# Patient Record
Sex: MALE | Race: WHITE | NOT HISPANIC OR LATINO | Employment: FULL TIME | URBAN - METROPOLITAN AREA
[De-identification: names, ages, dates, MRNs, and addresses within clinical notes are randomized per-mention and may not be internally consistent; named-entity substitution may affect disease eponyms.]

---

## 2023-07-28 ENCOUNTER — ANCILLARY PROCEDURE (OUTPATIENT)
Dept: CARDIOLOGY | Facility: CLINIC | Age: 78
End: 2023-07-28
Attending: INTERNAL MEDICINE
Payer: COMMERCIAL

## 2023-07-28 ENCOUNTER — HOSPITAL ENCOUNTER (OUTPATIENT)
Dept: MRI IMAGING | Facility: CLINIC | Age: 78
Discharge: HOME | End: 2023-07-28
Attending: ORTHOPAEDIC SURGERY
Payer: COMMERCIAL

## 2023-07-28 ENCOUNTER — HOSPITAL ENCOUNTER (OUTPATIENT)
Dept: GENERAL RADIOLOGY | Facility: CLINIC | Age: 78
Discharge: HOME | End: 2023-07-28
Attending: ORTHOPAEDIC SURGERY
Payer: COMMERCIAL

## 2023-07-28 VITALS — OXYGEN SATURATION: 95 % | HEART RATE: 74 BPM

## 2023-07-28 DIAGNOSIS — M54.50 ACUTE LOW BACK PAIN: ICD-10-CM

## 2023-07-28 DIAGNOSIS — Z95.0 CARDIAC PACEMAKER IN SITU: ICD-10-CM

## 2023-07-28 DIAGNOSIS — Z95.0 CARDIAC PACEMAKER IN SITU: Primary | ICD-10-CM

## 2023-07-28 DIAGNOSIS — Z45.02 ENCOUNTER FOR ADJUSTMENT AND MANAGEMENT OF AUTOMATIC IMPLANTABLE CARDIAC DEFIBRILLATOR: ICD-10-CM

## 2023-07-28 LAB
MDC_IDC_LEAD_IMPLANT_DT: NORMAL
MDC_IDC_LEAD_LOCATION: NORMAL
MDC_IDC_LEAD_LOCATION_DETAIL_1: NORMAL
MDC_IDC_LEAD_MFG: NORMAL
MDC_IDC_LEAD_MODEL: NORMAL
MDC_IDC_LEAD_POLARITY_TYPE: NORMAL
MDC_IDC_LEAD_SERIAL: NORMAL
MDC_IDC_LEAD_SPECIAL_FUNCTION: NORMAL
MDC_IDC_MSMT_BATTERY_DTM: NORMAL
MDC_IDC_MSMT_BATTERY_DTM: NORMAL
MDC_IDC_MSMT_BATTERY_REMAINING_LONGEVITY: 175 MO
MDC_IDC_MSMT_BATTERY_REMAINING_LONGEVITY: 175 MO
MDC_IDC_MSMT_BATTERY_RRT_TRIGGER: 2.62
MDC_IDC_MSMT_BATTERY_RRT_TRIGGER: 2.62
MDC_IDC_MSMT_BATTERY_STATUS: NORMAL
MDC_IDC_MSMT_BATTERY_STATUS: NORMAL
MDC_IDC_MSMT_BATTERY_VOLTAGE: 3.21 V
MDC_IDC_MSMT_BATTERY_VOLTAGE: 3.21 V
MDC_IDC_MSMT_LEADCHNL_RA_IMPEDANCE_VALUE: 418 OHM
MDC_IDC_MSMT_LEADCHNL_RA_IMPEDANCE_VALUE: 418 OHM
MDC_IDC_MSMT_LEADCHNL_RA_PACING_THRESHOLD_AMPLITUDE: 0.5 V
MDC_IDC_MSMT_LEADCHNL_RA_PACING_THRESHOLD_AMPLITUDE: 0.5 V
MDC_IDC_MSMT_LEADCHNL_RA_PACING_THRESHOLD_PULSEWIDTH: 0.4 MS
MDC_IDC_MSMT_LEADCHNL_RA_PACING_THRESHOLD_PULSEWIDTH: 0.4 MS
MDC_IDC_MSMT_LEADCHNL_RA_SENSING_INTR_AMPL: 1.5 MV
MDC_IDC_MSMT_LEADCHNL_RA_SENSING_INTR_AMPL: 1.6 MV
MDC_IDC_MSMT_LEADCHNL_RV_IMPEDANCE_VALUE: 456 OHM
MDC_IDC_MSMT_LEADCHNL_RV_IMPEDANCE_VALUE: 475 OHM
MDC_IDC_MSMT_LEADCHNL_RV_PACING_THRESHOLD_AMPLITUDE: 1.75 V
MDC_IDC_MSMT_LEADCHNL_RV_PACING_THRESHOLD_AMPLITUDE: 1.75 V
MDC_IDC_MSMT_LEADCHNL_RV_PACING_THRESHOLD_PULSEWIDTH: 0.4 MS
MDC_IDC_MSMT_LEADCHNL_RV_PACING_THRESHOLD_PULSEWIDTH: 0.4 MS
MDC_IDC_MSMT_LEADCHNL_RV_SENSING_INTR_AMPL: 4.1 MV
MDC_IDC_MSMT_LEADCHNL_RV_SENSING_INTR_AMPL: 4.6 MV
MDC_IDC_PG_IMPLANT_DTM: NORMAL
MDC_IDC_PG_IMPLANT_DTM: NORMAL
MDC_IDC_PG_MFG: NORMAL
MDC_IDC_PG_MFG: NORMAL
MDC_IDC_PG_MODEL: NORMAL
MDC_IDC_PG_MODEL: NORMAL
MDC_IDC_PG_SERIAL: NORMAL
MDC_IDC_PG_SERIAL: NORMAL
MDC_IDC_PG_TYPE: NORMAL
MDC_IDC_PG_TYPE: NORMAL
MDC_IDC_SESS_CLINIC_NAME: NORMAL
MDC_IDC_SESS_CLINIC_NAME: NORMAL
MDC_IDC_SESS_DTM: NORMAL
MDC_IDC_SESS_DTM: NORMAL
MDC_IDC_SESS_TYPE: NORMAL
MDC_IDC_SESS_TYPE: NORMAL
MDC_IDC_SET_BRADY_AT_MODE_SWITCH_RATE: 171 {BEATS}/MIN
MDC_IDC_SET_BRADY_AT_MODE_SWITCH_RATE: 171 {BEATS}/MIN
MDC_IDC_SET_BRADY_HYSTRATE: NORMAL
MDC_IDC_SET_BRADY_HYSTRATE: NORMAL
MDC_IDC_SET_BRADY_LOWRATE: 60 {BEATS}/MIN
MDC_IDC_SET_BRADY_LOWRATE: 60 {BEATS}/MIN
MDC_IDC_SET_BRADY_MAX_SENSOR_RATE: 130 {BEATS}/MIN
MDC_IDC_SET_BRADY_MAX_SENSOR_RATE: 130 {BEATS}/MIN
MDC_IDC_SET_BRADY_MAX_TRACKING_RATE: 130 {BEATS}/MIN
MDC_IDC_SET_BRADY_MAX_TRACKING_RATE: 130 {BEATS}/MIN
MDC_IDC_SET_BRADY_MODE: NORMAL
MDC_IDC_SET_BRADY_MODE: NORMAL
MDC_IDC_SET_BRADY_PAV_DELAY_LOW: 180 MS
MDC_IDC_SET_BRADY_PAV_DELAY_LOW: 180 MS
MDC_IDC_SET_BRADY_SAV_DELAY_LOW: 150 MS
MDC_IDC_SET_BRADY_SAV_DELAY_LOW: 150 MS
MDC_IDC_SET_LEADCHNL_RA_PACING_AMPLITUDE: 1.5 V
MDC_IDC_SET_LEADCHNL_RA_PACING_AMPLITUDE: 1.5 V
MDC_IDC_SET_LEADCHNL_RA_PACING_ANODE_ELECTRODE_1: NORMAL
MDC_IDC_SET_LEADCHNL_RA_PACING_ANODE_ELECTRODE_1: NORMAL
MDC_IDC_SET_LEADCHNL_RA_PACING_ANODE_LOCATION_1: NORMAL
MDC_IDC_SET_LEADCHNL_RA_PACING_ANODE_LOCATION_1: NORMAL
MDC_IDC_SET_LEADCHNL_RA_PACING_CAPTURE_MODE: NORMAL
MDC_IDC_SET_LEADCHNL_RA_PACING_CAPTURE_MODE: NORMAL
MDC_IDC_SET_LEADCHNL_RA_PACING_CATHODE_ELECTRODE_1: NORMAL
MDC_IDC_SET_LEADCHNL_RA_PACING_CATHODE_ELECTRODE_1: NORMAL
MDC_IDC_SET_LEADCHNL_RA_PACING_CATHODE_LOCATION_1: NORMAL
MDC_IDC_SET_LEADCHNL_RA_PACING_CATHODE_LOCATION_1: NORMAL
MDC_IDC_SET_LEADCHNL_RA_PACING_POLARITY: NORMAL
MDC_IDC_SET_LEADCHNL_RA_PACING_POLARITY: NORMAL
MDC_IDC_SET_LEADCHNL_RA_PACING_PULSEWIDTH: 0.4 MS
MDC_IDC_SET_LEADCHNL_RA_PACING_PULSEWIDTH: 0.4 MS
MDC_IDC_SET_LEADCHNL_RA_SENSING_ANODE_ELECTRODE_1: NORMAL
MDC_IDC_SET_LEADCHNL_RA_SENSING_ANODE_ELECTRODE_1: NORMAL
MDC_IDC_SET_LEADCHNL_RA_SENSING_ANODE_LOCATION_1: NORMAL
MDC_IDC_SET_LEADCHNL_RA_SENSING_ANODE_LOCATION_1: NORMAL
MDC_IDC_SET_LEADCHNL_RA_SENSING_CATHODE_ELECTRODE_1: NORMAL
MDC_IDC_SET_LEADCHNL_RA_SENSING_CATHODE_ELECTRODE_1: NORMAL
MDC_IDC_SET_LEADCHNL_RA_SENSING_CATHODE_LOCATION_1: NORMAL
MDC_IDC_SET_LEADCHNL_RA_SENSING_CATHODE_LOCATION_1: NORMAL
MDC_IDC_SET_LEADCHNL_RA_SENSING_POLARITY: NORMAL
MDC_IDC_SET_LEADCHNL_RA_SENSING_POLARITY: NORMAL
MDC_IDC_SET_LEADCHNL_RA_SENSING_SENSITIVITY: 0.15 MV
MDC_IDC_SET_LEADCHNL_RA_SENSING_SENSITIVITY: 0.15 MV
MDC_IDC_SET_LEADCHNL_RV_PACING_AMPLITUDE: 2.75 V
MDC_IDC_SET_LEADCHNL_RV_PACING_AMPLITUDE: 2.75 V
MDC_IDC_SET_LEADCHNL_RV_PACING_ANODE_ELECTRODE_1: NORMAL
MDC_IDC_SET_LEADCHNL_RV_PACING_ANODE_ELECTRODE_1: NORMAL
MDC_IDC_SET_LEADCHNL_RV_PACING_ANODE_LOCATION_1: NORMAL
MDC_IDC_SET_LEADCHNL_RV_PACING_ANODE_LOCATION_1: NORMAL
MDC_IDC_SET_LEADCHNL_RV_PACING_CAPTURE_MODE: NORMAL
MDC_IDC_SET_LEADCHNL_RV_PACING_CAPTURE_MODE: NORMAL
MDC_IDC_SET_LEADCHNL_RV_PACING_CATHODE_ELECTRODE_1: NORMAL
MDC_IDC_SET_LEADCHNL_RV_PACING_CATHODE_ELECTRODE_1: NORMAL
MDC_IDC_SET_LEADCHNL_RV_PACING_CATHODE_LOCATION_1: NORMAL
MDC_IDC_SET_LEADCHNL_RV_PACING_CATHODE_LOCATION_1: NORMAL
MDC_IDC_SET_LEADCHNL_RV_PACING_POLARITY: NORMAL
MDC_IDC_SET_LEADCHNL_RV_PACING_POLARITY: NORMAL
MDC_IDC_SET_LEADCHNL_RV_PACING_PULSEWIDTH: 0.4 MS
MDC_IDC_SET_LEADCHNL_RV_PACING_PULSEWIDTH: 0.4 MS
MDC_IDC_SET_LEADCHNL_RV_SENSING_ANODE_ELECTRODE_1: NORMAL
MDC_IDC_SET_LEADCHNL_RV_SENSING_ANODE_ELECTRODE_1: NORMAL
MDC_IDC_SET_LEADCHNL_RV_SENSING_ANODE_LOCATION_1: NORMAL
MDC_IDC_SET_LEADCHNL_RV_SENSING_ANODE_LOCATION_1: NORMAL
MDC_IDC_SET_LEADCHNL_RV_SENSING_CATHODE_ELECTRODE_1: NORMAL
MDC_IDC_SET_LEADCHNL_RV_SENSING_CATHODE_ELECTRODE_1: NORMAL
MDC_IDC_SET_LEADCHNL_RV_SENSING_CATHODE_LOCATION_1: NORMAL
MDC_IDC_SET_LEADCHNL_RV_SENSING_CATHODE_LOCATION_1: NORMAL
MDC_IDC_SET_LEADCHNL_RV_SENSING_POLARITY: NORMAL
MDC_IDC_SET_LEADCHNL_RV_SENSING_POLARITY: NORMAL
MDC_IDC_SET_LEADCHNL_RV_SENSING_SENSITIVITY: 0.9 MV
MDC_IDC_SET_LEADCHNL_RV_SENSING_SENSITIVITY: 0.9 MV
MDC_IDC_SET_ZONE_DETECTION_INTERVAL: 350 MS
MDC_IDC_SET_ZONE_DETECTION_INTERVAL: 350 MS
MDC_IDC_SET_ZONE_DETECTION_INTERVAL: 400 MS
MDC_IDC_SET_ZONE_DETECTION_INTERVAL: 400 MS
MDC_IDC_SET_ZONE_TYPE: NORMAL
MDC_IDC_STAT_AT_BURDEN_PERCENT: 0 %
MDC_IDC_STAT_AT_BURDEN_PERCENT: 0 %
MDC_IDC_STAT_AT_DTM_END: NORMAL
MDC_IDC_STAT_AT_DTM_END: NORMAL
MDC_IDC_STAT_AT_DTM_START: NORMAL
MDC_IDC_STAT_AT_DTM_START: NORMAL
MDC_IDC_STAT_BRADY_AP_VP_PERCENT: 0.05 %
MDC_IDC_STAT_BRADY_AP_VP_PERCENT: 0.05 %
MDC_IDC_STAT_BRADY_AP_VS_PERCENT: 26.71 %
MDC_IDC_STAT_BRADY_AP_VS_PERCENT: 26.72 %
MDC_IDC_STAT_BRADY_AS_VP_PERCENT: 0.03 %
MDC_IDC_STAT_BRADY_AS_VP_PERCENT: 0.03 %
MDC_IDC_STAT_BRADY_AS_VS_PERCENT: 73.21 %
MDC_IDC_STAT_BRADY_AS_VS_PERCENT: 73.22 %
MDC_IDC_STAT_BRADY_DTM_END: NORMAL
MDC_IDC_STAT_BRADY_DTM_END: NORMAL
MDC_IDC_STAT_BRADY_DTM_START: NORMAL
MDC_IDC_STAT_BRADY_DTM_START: NORMAL
MDC_IDC_STAT_BRADY_RA_PERCENT_PACED: 26.82 %
MDC_IDC_STAT_BRADY_RA_PERCENT_PACED: 26.83 %
MDC_IDC_STAT_BRADY_RV_PERCENT_PACED: 0.08 %
MDC_IDC_STAT_BRADY_RV_PERCENT_PACED: 0.08 %
MDC_IDC_STAT_EPISODE_RECENT_COUNT: 0
MDC_IDC_STAT_EPISODE_RECENT_COUNT_DTM_END: NORMAL
MDC_IDC_STAT_EPISODE_RECENT_COUNT_DTM_START: NORMAL
MDC_IDC_STAT_EPISODE_TOTAL_COUNT: 0
MDC_IDC_STAT_EPISODE_TOTAL_COUNT: 1
MDC_IDC_STAT_EPISODE_TOTAL_COUNT: 1
MDC_IDC_STAT_EPISODE_TOTAL_COUNT_DTM_END: NORMAL
MDC_IDC_STAT_EPISODE_TOTAL_COUNT_DTM_START: NORMAL
MDC_IDC_STAT_EPISODE_TYPE: NORMAL

## 2023-07-28 PROCEDURE — 93286 PERI-PX EVAL PM/LDLS PM IP: CPT

## 2023-07-28 PROCEDURE — 72148 MRI LUMBAR SPINE W/O DYE: CPT

## 2023-07-28 PROCEDURE — 71046 X-RAY EXAM CHEST 2 VIEWS: CPT

## 2023-07-28 PROCEDURE — 71046 X-RAY EXAM CHEST 2 VIEWS: CPT | Mod: 26 | Performed by: RADIOLOGY

## 2023-07-28 PROCEDURE — 72148 MRI LUMBAR SPINE W/O DYE: CPT | Mod: 26 | Performed by: RADIOLOGY

## 2023-07-28 PROCEDURE — 93286 PERI-PX EVAL PM/LDLS PM IP: CPT | Mod: 26 | Performed by: INTERNAL MEDICINE

## 2023-11-26 ENCOUNTER — LAB REQUISITION (OUTPATIENT)
Dept: LAB | Facility: CLINIC | Age: 78
End: 2023-11-26
Payer: COMMERCIAL

## 2023-11-26 DIAGNOSIS — Z11.1 ENCOUNTER FOR SCREENING FOR RESPIRATORY TUBERCULOSIS: ICD-10-CM

## 2023-11-27 ENCOUNTER — TRANSITIONAL CARE UNIT VISIT (OUTPATIENT)
Dept: GERIATRICS | Facility: CLINIC | Age: 78
End: 2023-11-27
Payer: COMMERCIAL

## 2023-11-27 VITALS
WEIGHT: 205 LBS | OXYGEN SATURATION: 96 % | TEMPERATURE: 97.8 F | SYSTOLIC BLOOD PRESSURE: 119 MMHG | HEIGHT: 72 IN | BODY MASS INDEX: 27.77 KG/M2 | HEART RATE: 69 BPM | RESPIRATION RATE: 18 BRPM | DIASTOLIC BLOOD PRESSURE: 60 MMHG

## 2023-11-27 DIAGNOSIS — G89.29 CHRONIC MIDLINE LOW BACK PAIN WITH BILATERAL SCIATICA: Primary | ICD-10-CM

## 2023-11-27 DIAGNOSIS — M54.42 CHRONIC MIDLINE LOW BACK PAIN WITH BILATERAL SCIATICA: Primary | ICD-10-CM

## 2023-11-27 DIAGNOSIS — K21.9 CHRONIC GERD: ICD-10-CM

## 2023-11-27 DIAGNOSIS — M54.41 CHRONIC MIDLINE LOW BACK PAIN WITH BILATERAL SCIATICA: Primary | ICD-10-CM

## 2023-11-27 DIAGNOSIS — M62.81 GENERALIZED MUSCLE WEAKNESS: ICD-10-CM

## 2023-11-27 DIAGNOSIS — R73.9 HYPERGLYCEMIA: ICD-10-CM

## 2023-11-27 PROBLEM — S09.90XA INJURY OF HEAD: Chronic | Status: ACTIVE | Noted: 2019-08-05

## 2023-11-27 PROBLEM — I25.10 ATHEROSCLEROTIC HEART DISEASE OF NATIVE CORONARY ARTERY WITHOUT ANGINA PECTORIS: Status: ACTIVE | Noted: 2021-02-03

## 2023-11-27 PROBLEM — R91.8 OTHER NONSPECIFIC ABNORMAL FINDING OF LUNG FIELD: Status: ACTIVE | Noted: 2021-02-03

## 2023-11-27 PROBLEM — H53.2 DOUBLE VISION: Status: ACTIVE | Noted: 2021-02-01

## 2023-11-27 PROBLEM — G83.4 CAUDA EQUINA SYNDROME (H): Status: ACTIVE | Noted: 2023-11-17

## 2023-11-27 PROBLEM — I20.0 UNSTABLE ANGINA (H): Status: ACTIVE | Noted: 2019-10-09

## 2023-11-27 PROBLEM — R93.89 ABNORMAL CT OF THE CHEST: Status: ACTIVE | Noted: 2023-02-15

## 2023-11-27 PROBLEM — R11.2 NAUSEA AND VOMITING: Status: ACTIVE | Noted: 2021-02-01

## 2023-11-27 PROBLEM — S06.9XAA TRAUMATIC BRAIN INJURY (H): Status: ACTIVE | Noted: 2021-02-12

## 2023-11-27 PROBLEM — E78.00 HYPERCHOLESTEREMIA: Status: ACTIVE | Noted: 2017-02-10

## 2023-11-27 PROBLEM — R26.2 DIFFICULTY IN WALKING, NOT ELSEWHERE CLASSIFIED: Status: ACTIVE | Noted: 2021-02-03
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
## 2023-11-27 PROBLEM — S06.0X0D CONCUSSION WITHOUT LOSS OF CONSCIOUSNESS, SUBSEQUENT ENCOUNTER: Status: ACTIVE | Noted: 2021-02-03

## 2023-11-27 PROBLEM — C67.9 MALIGNANT NEOPLASM OF BLADDER, UNSPECIFIED (H): Status: ACTIVE | Noted: 2021-02-03

## 2023-11-27 PROBLEM — R33.8 ACUTE URINARY RETENTION: Status: ACTIVE | Noted: 2023-11-19

## 2023-11-27 PROBLEM — R51.9 HEADACHE: Status: ACTIVE | Noted: 2021-02-01

## 2023-11-27 PROBLEM — M54.6 ACUTE MIDLINE THORACIC BACK PAIN: Status: ACTIVE | Noted: 2023-02-15

## 2023-11-27 PROBLEM — I49.5 SICK SINUS SYNDROME (H): Status: ACTIVE | Noted: 2018-01-24

## 2023-11-27 PROBLEM — M43.22 CERVICAL VERTEBRAL FUSION: Status: ACTIVE | Noted: 2017-07-31

## 2023-11-27 PROBLEM — N17.9 AKI (ACUTE KIDNEY INJURY) (H): Status: ACTIVE | Noted: 2019-10-09

## 2023-11-27 PROBLEM — R91.8 LUNG NODULES: Status: ACTIVE | Noted: 2019-10-09

## 2023-11-27 PROBLEM — S41.112D: Status: ACTIVE | Noted: 2018-06-21

## 2023-11-27 PROBLEM — I95.9 HYPOTENSION: Status: ACTIVE | Noted: 2019-11-04

## 2023-11-27 PROBLEM — R41.3 MEMORY LOSS: Status: ACTIVE | Noted: 2021-02-01

## 2023-11-27 PROBLEM — R10.32 LLQ ABDOMINAL PAIN: Status: ACTIVE | Noted: 2019-12-26

## 2023-11-27 PROBLEM — F32.A ANXIETY AND DEPRESSION: Status: ACTIVE | Noted: 2018-01-24

## 2023-11-27 PROBLEM — E53.8 B12 DEFICIENCY: Status: ACTIVE | Noted: 2017-07-31

## 2023-11-27 PROBLEM — F33.9 EPISODE OF RECURRENT MAJOR DEPRESSIVE DISORDER (H): Status: ACTIVE | Noted: 2020-02-18

## 2023-11-27 PROBLEM — K29.80 DUODENITIS: Status: ACTIVE | Noted: 2022-02-18

## 2023-11-27 PROBLEM — M48.061 SPINAL STENOSIS OF LUMBAR REGION WITHOUT NEUROGENIC CLAUDICATION: Status: ACTIVE | Noted: 2023-11-20

## 2023-11-27 PROBLEM — I45.10 RBBB: Status: ACTIVE | Noted: 2019-10-09

## 2023-11-27 PROBLEM — K63.5 POLYP OF COLON: Status: ACTIVE | Noted: 2018-08-16

## 2023-11-27 PROBLEM — R42 DIZZINESS: Status: ACTIVE | Noted: 2018-07-15

## 2023-11-27 PROBLEM — M25.551 ACUTE RIGHT HIP PAIN: Status: ACTIVE | Noted: 2019-07-01

## 2023-11-27 PROBLEM — D69.6 THROMBOCYTOPENIA (H): Status: ACTIVE | Noted: 2019-02-05

## 2023-11-27 PROBLEM — R05.9 COUGH: Status: ACTIVE | Noted: 2019-08-05

## 2023-11-27 PROBLEM — I71.40 AAA (ABDOMINAL AORTIC ANEURYSM) (H): Status: ACTIVE | Noted: 2018-01-24

## 2023-11-27 PROBLEM — R55 SYNCOPE: Status: ACTIVE | Noted: 2019-08-05

## 2023-11-27 PROBLEM — K43.0 INCARCERATED INCISIONAL HERNIA: Status: ACTIVE | Noted: 2019-01-16

## 2023-11-27 PROBLEM — F41.9 ANXIETY AND DEPRESSION: Status: ACTIVE | Noted: 2018-01-24

## 2023-11-27 PROBLEM — Z93.3 S/P COLOSTOMY (H): Status: ACTIVE | Noted: 2017-07-31

## 2023-11-27 PROBLEM — G47.33 OBSTRUCTIVE SLEEP APNEA OF ADULT: Status: ACTIVE | Noted: 2022-03-02

## 2023-11-27 PROCEDURE — 99310 SBSQ NF CARE HIGH MDM 45: CPT | Performed by: NURSE PRACTITIONER

## 2023-11-27 RX ORDER — GABAPENTIN 600 MG/1
600 TABLET ORAL 3 TIMES DAILY
COMMUNITY

## 2023-11-27 RX ORDER — ACETAMINOPHEN 500 MG
1000 TABLET ORAL 3 TIMES DAILY
COMMUNITY

## 2023-11-27 RX ORDER — ATORVASTATIN CALCIUM 80 MG/1
80 TABLET, FILM COATED ORAL DAILY
COMMUNITY

## 2023-11-27 RX ORDER — MOMETASONE FUROATE MONOHYDRATE 50 UG/1
2 SPRAY, METERED NASAL DAILY
COMMUNITY

## 2023-11-27 RX ORDER — NITROGLYCERIN 0.3 MG/1
0.3 TABLET SUBLINGUAL EVERY 5 MIN PRN
COMMUNITY

## 2023-11-27 RX ORDER — TAMSULOSIN HYDROCHLORIDE 0.4 MG/1
0.4 CAPSULE ORAL DAILY
COMMUNITY

## 2023-11-27 RX ORDER — LIDOCAINE 50 MG/G
PATCH TOPICAL EVERY 24 HOURS
COMMUNITY

## 2023-11-27 RX ORDER — LOSARTAN POTASSIUM 25 MG/1
25 TABLET ORAL DAILY
COMMUNITY

## 2023-11-27 RX ORDER — MIRTAZAPINE 45 MG/1
45 TABLET, FILM COATED ORAL AT BEDTIME
COMMUNITY

## 2023-11-27 RX ORDER — HYDROMORPHONE HYDROCHLORIDE 2 MG/1
2-4 TABLET ORAL EVERY 4 HOURS PRN
COMMUNITY

## 2023-11-27 RX ORDER — ALBUTEROL SULFATE 90 UG/1
2 AEROSOL, METERED RESPIRATORY (INHALATION) EVERY 4 HOURS PRN
COMMUNITY

## 2023-11-27 RX ORDER — INSULIN LISPRO 100 [IU]/ML
INJECTION, SOLUTION INTRAVENOUS; SUBCUTANEOUS
COMMUNITY
End: 2023-11-27

## 2023-11-27 RX ORDER — ASPIRIN 81 MG/1
81 TABLET ORAL DAILY
COMMUNITY

## 2023-11-27 NOTE — LETTER
11/27/2023        RE: Malvin Phillips  [address] [address]                                                                                      [facility]     Code Status:  FULL CODE  Visit Type: Hospital F/U     Facility:   [facility] ([facility]) [[id number]]        History of Present Illness:   Hospital Admission Date: 11/17/2023    Hospital Discharge Date: 11/24/2023      Malvin Phillips is a 78 year old male with past medical history for AAA, COPD, DM2, chronic low back pain.  He was recently hospitalized for worsening back pain and urinary incontinence.  He was seen by neurosurgy and cauda equina syndrome was ruled out.  He underwent left L5-S1 CHAITANYA.  Gabapentin was increased and he was put on apap, dilaudid and lidocaine patch.      Today, he is demanding to be discharged and feels his pain has greatly improved.  He plans to follow up with orthopedics and will be setting up outpatient therapy.      BS have been < 200 here in the facility.  Patient does not take insulin at home and was started by the hospital due to increased BS after steroid injection.     History reviewed. No pertinent past medical history.  History reviewed. No pertinent surgical history.  History reviewed. No pertinent family history.  Social History     Socioeconomic History     Marital status:      Spouse name: Not on file     Number of children: Not on file     Years of education: Not on file     Highest education level: Not on file   Occupational History     Not on file   Tobacco Use     Smoking status: Unknown     Smokeless tobacco: Not on file   Substance and Sexual Activity     Alcohol use: Not on file     Drug use: Not on file     Sexual activity: Not on file   Other Topics Concern     Not on file   Social History Narrative     Not on file     Social Determinants of Health     Financial Resource Strain: Not on file   Food Insecurity: Not on file   Transportation Needs: Not  on file   Physical Activity: Not on file   Stress: Not on file   Social Connections: Not on file   Interpersonal Safety: Not on file   Housing Stability: Not on file       Current Outpatient Medications   Medication Sig Dispense Refill     acetaminophen (TYLENOL) 500 MG tablet Take 1,000 mg by mouth 3 times daily       albuterol (PROAIR HFA/PROVENTIL HFA/VENTOLIN HFA) 108 (90 Base) MCG/ACT inhaler Inhale 2 puffs into the lungs every 4 hours as needed for shortness of breath, wheezing or cough       aspirin 81 MG EC tablet Take 81 mg by mouth daily       atorvastatin (LIPITOR) 80 MG tablet Take 80 mg by mouth daily       gabapentin (NEURONTIN) 600 MG tablet Take 600 mg by mouth 3 times daily       HYDROmorphone (DILAUDID) 2 MG tablet Take 2-4 mg by mouth every 4 hours as needed for severe pain       lidocaine (LIDODERM) 5 % patch Place onto the skin every 24 hours To prevent lidocaine toxicity, patient should be patch free for 12 hrs daily.       losartan (COZAAR) 25 MG tablet Take 25 mg by mouth daily       mirtazapine (REMERON) 45 MG tablet Take 45 mg by mouth at bedtime       mometasone (NASONEX) 50 MCG/ACT nasal spray Spray 2 sprays into both nostrils daily       nitroGLYcerin (NITROSTAT) 0.3 MG sublingual tablet Place 0.3 mg under the tongue every 5 minutes as needed for chest pain For chest pain place 1 tablet under the tongue every 5 minutes for 3 doses. If symptoms persist 5 minutes after 1st dose call 911.       omeprazole (PRILOSEC) 20 MG DR capsule Take 20 mg by mouth daily       tamsulosin (FLOMAX) 0.4 MG capsule Take 0.4 mg by mouth daily       umeclidinium-vilanterol (ANORO ELLIPTA) 62.5-25 MCG/ACT oral inhaler Inhale 1 puff into the lungs daily       Allergies   Allergen Reactions     Fentanyl Dermatitis, Itching and Swelling     Fentanyl patch: reaction: swelling/itching     Penicillins Unknown, Dermatitis, Other (See Comments) and Shortness Of Breath     Other reaction(s): *Unknown      As a child       As a child Per the Antimicrobial Stewardship Team 10/19:  No similarities in side chains for PCN and Ancef, very low to no risk of cross-reactivity.      Other reaction(s): *Unknown Other reaction(s): *Unknown As a child As a child    As a child   Per the Antimicrobial Stewardship Team 10/19:  No similarities in side chains for PCN and Ancef, very low to no risk of cross-reactivity.     Immunization History   Administered Date(s) Administered     COVID-19 12+ (2023-24) (Pfizer) 10/28/2023     COVID-19 Bivalent 12+ (Pfizer) 09/22/2022     COVID-19 MONOVALENT 12+ (Pfizer) 02/19/2021, 03/12/2021, 09/29/2021     COVID-19 Monovalent 12+ (Pfizer 2022) 04/09/2022     DT (PEDS <7y) 01/02/1984     DTaP, Unspecified 05/30/2015     Flu 65+ Years 09/25/2017, 10/07/2018, 09/12/2019     Flu, Unspecified 11/04/1992, 11/22/1993, 11/09/1994, 10/01/2012, 11/25/2013, 11/17/2014, 11/02/2015     D0k9-04 Novel Flu 09/25/2017, 10/07/2018, 09/12/2019     Influenza (High Dose) 3 valent vaccine 10/08/2017, 10/07/2018     Influenza Vaccine 65+ (FLUAD) 10/25/2022     Influenza Vaccine 65+ (Fluzone HD) 09/29/2020, 09/25/2021, 10/28/2023     Pneumo Conj 13-V (2010&after) 04/16/2016     Pneumococcal 23 valent 01/01/2010, 01/16/2016     RSV Vaccine (Abrysvo) 11/04/2023     TDAP (Adacel,Boostrix) 01/01/2007, 05/30/2015     Zoster recombinant adjuvanted (SHINGRIX) 03/16/2019, 01/03/2021, 07/16/2021     Zoster vaccine, live 01/17/2013, 07/25/2013, 01/03/2021         Post Discharge Medication Reconciliation Status: discharge medications reconciled and changed, per note/orders.    Medications list and allergies in the facility chart have been reviewed.  Please see facility EMR for most up to date list.         Review of Systems   Patient denies fever, chills, headache, lightheadedness, dizziness, rhinorrhea, cough, congestion, shortness of breath, chest pain, palpitations, abdominal pain, n/v, diarrhea, constipation, change in appetite, change in  sleep pattern, dysuria, frequency, burning or pain with urination.  Other than stated in HPI all other review of systems is negative.       Physical Exam  Vital signs:/60   Pulse 69   Temp 97.8  F (36.6  C)   Resp 18   Ht 1.829 m (6')   Wt 93 kg (205 lb)   SpO2 96%   BMI 27.80 kg/m     GENERAL APPEARANCE: thin, elderly male,  in no acute distress.  HEENT: normocephalic, atraumatic  sclerae anicteric, conjunctivae clear and moist, EOM intact  LUNGS: Lung sounds CTA, no adventitious sounds, respiratory effort normal.  CARD: RRR, S1, S2, without murmurs, gallops, rubs  MSK: Muscle strength and tone were equal bilaterally. Moves all extremities easily and intentionally.   EXTREMITIES: No cyanosis, clubbing or edema.  NEURO: Alert and oriented x 3. Face is symmetric.  SKIN: Inspection of the skin reveals no rashes, ulcerations or petechiae.  PSYCH: euthymic        Labs:    Hemoglobin A1C 6.8 (H) <=5.6 %     WBC 9.1 3.5 - 10.5 x10(9)/L   11/19/2023 1:17 PM [facility]     RBC 4.36 4.32 - 5.72 x10(12)/L   11/19/2023 1:17 PM [facility]     Hemoglobin 12.9 (L) 13.5 - 17.5 g/dL   11/19/2023 1:17 PM [facility]     HCT 39.0 38.8 - 50.0 %   11/19/2023 1:17 PM [facility]     MCV 89.4 80.0 - 100.0 fL   11/19/2023 1:17 PM [facility]     MCH 29.6 27.6 - 33.3 pg   11/19/2023 1:17 PM [facility]     MCHC 33.1 31.5 - 35.2 g/dL   11/19/2023 1:17 PM [facility]     RDW 14.3 11.9 - 15.5 %   11/19/2023 1:17 PM [facility]     Platelets 120 (L) 150 - 450 x10(9)/L   11/19/2023 1:17 PM [facility]     Automated NRBC 0 <=0 /100 WBC   11/19/2023 1:17 PM [facility]     Neutrophil Absolute 6.9 1.7 - 7.0 10(9)/L   11/19/2023 1:17 PM [facility]     Lymphocyte Absolute 1.3 1.0 - 4.8 10(9)/L   11/19/2023 1:17 PM [facility]     Monocyte Absolute 0.8 0.2 - 0.9 10(9)/L    11/19/2023 1:17 PM [facility]     Eosinophil Absolute 0.1 0.0 - 0.5 10(9)/L   11/19/2023 1:17 PM [facility]     Basophil Absolute 0.0 0.0 - 0.3 10(9)/L   11/19/2023 1:17 PM [facility]     Immature Granulocyte % 0.3 0.0 - 0.5 %          Sodium 137 136 - 145 mmol/L   11/20/2023 10:16 AM [facility]     Potassium 4.1 3.5 - 5.1 mmol/L   11/20/2023 10:16 AM [facility]     Comment: Specimen slightly hemolyzed. Hemolysis may affect result.   Chloride 102 98 - 109 mmol/L   11/20/2023 10:16 AM [facility]     CO2 27 20 - 29 mmol/L   [facility]2023 10[facility]     Anion Gap 8 7 - 16 mmol/L   11/20/2023 10:16 AM [facility]     Calcium 9.4 8.4 - 10.4 mg/dL   11/20/2023 10:16 AM [facility]     BUN 17 7 - 26 mg/dL   11/20/2023 10:16 [facility]     Creatinine 1.03 0.73 - 1.18 mg/dL   11/20/2023 10:16 AM [facility]     Glucose 193 (H) 70 - 100 mg/dL                Assessment/plan:   Chronic midline low back pain with bilateral sciatica  Pain controlled, counseled patient to follow up with orthopedics after last CHAITANYA.  PT/OT continue with lidoderm, gabapentin and prn dilaudid (what is left in the facility)    Generalized muscle weakness  Outpatient PT/OT    Hyperglycemia  BS improving, A1c elevated 6.8% recommend follow up with PCP.  Discontinue humalog.      Chronic GERD  Will need to follow up with GI. Had some dysphagia in the hospital.  Continue with Prilosec.     45 total minutes spent in reviewing medical records from [facility] [place], [place], assessing patient and coordinating his discharge plan with patient and nursing.     Electronically signed by: Josseline Segovia NP      Sincerely,        Josseline Segovia NP

## 2023-11-27 NOTE — PROGRESS NOTES
[facility]     Code Status:  FULL CODE  Visit Type: Hospital F/U     Facility:   [facility] ([facility]) [[id number]]        History of Present Illness:   Hospital Admission Date: 11/17/2023    Hospital Discharge Date: 11/24/2023      Malvin Phillips is a 78 year old male with past medical history for AAA, COPD, DM2, chronic low back pain.  He was recently hospitalized for worsening back pain and urinary incontinence.  He was seen by neurosurgy and cauda equina syndrome was ruled out.  He underwent left L5-S1 CHAITANYA.  Gabapentin was increased and he was put on apap, dilaudid and lidocaine patch.      Today, he is demanding to be discharged and feels his pain has greatly improved.  He plans to follow up with orthopedics and will be setting up outpatient therapy.      BS have been < 200 here in the facility.  Patient does not take insulin at home and was started by the hospital due to increased BS after steroid injection.     History reviewed. No pertinent past medical history.  History reviewed. No pertinent surgical history.  History reviewed. No pertinent family history.  Social History     Socioeconomic History    Marital status:      Spouse name: Not on file    Number of children: Not on file    Years of education: Not on file    Highest education level: Not on file   Occupational History    Not on file   Tobacco Use    Smoking status: Unknown    Smokeless tobacco: Not on file   Substance and Sexual Activity    Alcohol use: Not on file    Drug use: Not on file    Sexual activity: Not on file   Other Topics Concern    Not on file   Social History Narrative    Not on file     Social Determinants of Health     Financial Resource Strain: Not on file   Food Insecurity: Not on file   Transportation Needs: Not on file   Physical Activity: Not on file   Stress: Not on file   Social Connections: Not on file    Interpersonal Safety: Not on file   Housing Stability: Not on file       Current Outpatient Medications   Medication Sig Dispense Refill    acetaminophen (TYLENOL) 500 MG tablet Take 1,000 mg by mouth 3 times daily      albuterol (PROAIR HFA/PROVENTIL HFA/VENTOLIN HFA) 108 (90 Base) MCG/ACT inhaler Inhale 2 puffs into the lungs every 4 hours as needed for shortness of breath, wheezing or cough      aspirin 81 MG EC tablet Take 81 mg by mouth daily      atorvastatin (LIPITOR) 80 MG tablet Take 80 mg by mouth daily      gabapentin (NEURONTIN) 600 MG tablet Take 600 mg by mouth 3 times daily      HYDROmorphone (DILAUDID) 2 MG tablet Take 2-4 mg by mouth every 4 hours as needed for severe pain      lidocaine (LIDODERM) 5 % patch Place onto the skin every 24 hours To prevent lidocaine toxicity, patient should be patch free for 12 hrs daily.      losartan (COZAAR) 25 MG tablet Take 25 mg by mouth daily      mirtazapine (REMERON) 45 MG tablet Take 45 mg by mouth at bedtime      mometasone (NASONEX) 50 MCG/ACT nasal spray Spray 2 sprays into both nostrils daily      nitroGLYcerin (NITROSTAT) 0.3 MG sublingual tablet Place 0.3 mg under the tongue every 5 minutes as needed for chest pain For chest pain place 1 tablet under the tongue every 5 minutes for 3 doses. If symptoms persist 5 minutes after 1st dose call 911.      omeprazole (PRILOSEC) 20 MG DR capsule Take 20 mg by mouth daily      tamsulosin (FLOMAX) 0.4 MG capsule Take 0.4 mg by mouth daily      umeclidinium-vilanterol (ANORO ELLIPTA) 62.5-25 MCG/ACT oral inhaler Inhale 1 puff into the lungs daily       Allergies   Allergen Reactions    Fentanyl Dermatitis, Itching and Swelling     Fentanyl patch: reaction: swelling/itching    Penicillins Unknown, Dermatitis, Other (See Comments) and Shortness Of Breath     Other reaction(s): *Unknown      As a child      As a child Per the Antimicrobial Stewardship Team 10/19:  No similarities in side chains for PCN and Ancef,  very low to no risk of cross-reactivity.      Other reaction(s): *Unknown Other reaction(s): *Unknown As a child As a child    As a child   Per the Antimicrobial Stewardship Team 10/19:  No similarities in side chains for PCN and Ancef, very low to no risk of cross-reactivity.     Immunization History   Administered Date(s) Administered    COVID-19 12+ (2023-24) (Pfizer) 10/28/2023    COVID-19 Bivalent 12+ (Pfizer) 09/22/2022    COVID-19 MONOVALENT 12+ (Pfizer) 02/19/2021, 03/12/2021, 09/29/2021    COVID-19 Monovalent 12+ (Pfizer 2022) 04/09/2022    DT (PEDS <7y) 01/02/1984    DTaP, Unspecified 05/30/2015    Flu 65+ Years 09/25/2017, 10/07/2018, 09/12/2019    Flu, Unspecified 11/04/1992, 11/22/1993, 11/09/1994, 10/01/2012, 11/25/2013, 11/17/2014, 11/02/2015    C8w9-52 Novel Flu 09/25/2017, 10/07/2018, 09/12/2019    Influenza (High Dose) 3 valent vaccine 10/08/2017, 10/07/2018    Influenza Vaccine 65+ (FLUAD) 10/25/2022    Influenza Vaccine 65+ (Fluzone HD) 09/29/2020, 09/25/2021, 10/28/2023    Pneumo Conj 13-V (2010&after) 04/16/2016    Pneumococcal 23 valent 01/01/2010, 01/16/2016    RSV Vaccine (Abrysvo) 11/04/2023    TDAP (Adacel,Boostrix) 01/01/2007, 05/30/2015    Zoster recombinant adjuvanted (SHINGRIX) 03/16/2019, 01/03/2021, 07/16/2021    Zoster vaccine, live 01/17/2013, 07/25/2013, 01/03/2021         Post Discharge Medication Reconciliation Status: discharge medications reconciled and changed, per note/orders.    Medications list and allergies in the facility chart have been reviewed.  Please see facility EMR for most up to date list.         Review of Systems   Patient denies fever, chills, headache, lightheadedness, dizziness, rhinorrhea, cough, congestion, shortness of breath, chest pain, palpitations, abdominal pain, n/v, diarrhea, constipation, change in appetite, change in sleep pattern, dysuria, frequency, burning or pain with urination.  Other than stated in HPI all other review of systems is  negative.       Physical Exam  Vital signs:/60   Pulse 69   Temp 97.8  F (36.6  C)   Resp 18   Ht 1.829 m (6')   Wt 93 kg (205 lb)   SpO2 96%   BMI 27.80 kg/m     GENERAL APPEARANCE: thin, elderly male,  in no acute distress.  HEENT: normocephalic, atraumatic  sclerae anicteric, conjunctivae clear and moist, EOM intact  LUNGS: Lung sounds CTA, no adventitious sounds, respiratory effort normal.  CARD: RRR, S1, S2, without murmurs, gallops, rubs  MSK: Muscle strength and tone were equal bilaterally. Moves all extremities easily and intentionally.   EXTREMITIES: No cyanosis, clubbing or edema.  NEURO: Alert and oriented x 3. Face is symmetric.  SKIN: Inspection of the skin reveals no rashes, ulcerations or petechiae.  PSYCH: euthymic        Labs:    Hemoglobin A1C 6.8 (H) <=5.6 %     WBC 9.1 3.5 - 10.5 x10(9)/L   11/19/2023 1:17 PM [facility]     RBC 4.36 4.32 - 5.72 x10(12)/L   11/19/2023 1:17 PM [facility]     Hemoglobin 12.9 (L) 13.5 - 17.5 g/dL   11/19/2023 1:17 PM [facility]     HCT 39.0 38.8 - 50.0 %   11/19/2023 1:17 PM [facility]     MCV 89.4 80.0 - 100.0 fL   11/19/2023 1:17 PM [facility]     MCH 29.6 27.6 - 33.3 pg   11/19/2023 1:17 PM [facility]     MCHC 33.1 31.5 - 35.2 g/dL   11/19/2023 1:17 PM [facility]     RDW 14.3 11.9 - 15.5 %   11/19/2023 1:17 PM [facility]     Platelets 120 (L) 150 - 450 x10(9)/L   11/19/2023 1:17 PM [facility]     Automated NRBC 0 <=0 /100 WBC   11/19/2023 1:17 PM [facility]     Neutrophil Absolute 6.9 1.7 - 7.0 10(9)/L   11/19/2023 1:17 PM [facility]     Lymphocyte Absolute 1.3 1.0 - 4.8 10(9)/L   11/19/2023 1:17 PM [facility]     Monocyte Absolute 0.8 0.2 - 0.9 10(9)/L   11/19/2023 1:17 PM [facility]     Eosinophil Absolute 0.1 0.0 - 0.5 10(9)/L   11/19/2023 1:17 PM [facility]     Basophil Absolute 0.0 0.0 - 0.3 10(9)/L   11/19/2023 1:17 PM [facility]     Immature Granulocyte % 0.3 0.0 - 0.5 %          Sodium 137 136 - 145 mmol/L   11/20/2023 10:16 AM [facility]     Potassium 4.1 3.5 - 5.1 mmol/L   11/20/2023 10:16 AM [facility]     Comment: Specimen slightly hemolyzed. Hemolysis may affect result.   Chloride 102 98 - 109 mmol/L   11/20/2023 10:16 AM [facility]     CO2 27 20 - 29 mmol/L   11/20/2023 10:16 AM [facility]     Anion Gap 8 7 - 16 mmol/L   11/20/2023 10:16 AM [facility]     Calcium 9.4 8.4 - 10.4 mg/dL   11/20/2023 10:16 AM [facility]     BUN 17 7 - 26 mg/dL   11/20/2023 10:16 AM [facility]     Creatinine 1.03 0.73 - 1.18 mg/dL   11/20/2023 10:16 AM [facility]     Glucose 193 (H) 70 - 100 mg/dL                Assessment/plan:   Chronic midline low back pain with bilateral sciatica  Pain controlled, counseled patient to follow up with orthopedics after last CHAITANYA.  PT/OT continue with lidoderm, gabapentin and prn dilaudid (what is left in the facility)    Generalized muscle weakness  Outpatient PT/OT    Hyperglycemia  BS improving, A1c elevated 6.8% recommend follow up with PCP.  Discontinue humalog.      Chronic GERD  Will need to follow up with GI. Had some dysphagia in the hospital.  Continue with Prilosec.     45 total minutes spent in reviewing medical records from [facility], assessing patient and coordinating his discharge plan with patient and nursing.     Electronically signed by: Josseline Segovia NP

## 2024-01-14 ENCOUNTER — HEALTH MAINTENANCE LETTER (OUTPATIENT)
Age: 79
End: 2024-01-14

## 2024-03-24 ENCOUNTER — HEALTH MAINTENANCE LETTER (OUTPATIENT)
Age: 79
End: 2024-03-24

## 2024-06-02 ENCOUNTER — HEALTH MAINTENANCE LETTER (OUTPATIENT)
Age: 79
End: 2024-06-02

## 2024-10-20 ENCOUNTER — HEALTH MAINTENANCE LETTER (OUTPATIENT)
Age: 79
End: 2024-10-20

## 2025-01-26 ENCOUNTER — HEALTH MAINTENANCE LETTER (OUTPATIENT)
Age: 80
End: 2025-01-26

## 2025-04-13 ENCOUNTER — HEALTH MAINTENANCE LETTER (OUTPATIENT)
Age: 80
End: 2025-04-13

## 2025-05-04 ENCOUNTER — HEALTH MAINTENANCE LETTER (OUTPATIENT)
Age: 80
End: 2025-05-04

## 2025-08-17 ENCOUNTER — HEALTH MAINTENANCE LETTER (OUTPATIENT)
Age: 80
End: 2025-08-17